# Patient Record
Sex: MALE | URBAN - METROPOLITAN AREA
[De-identification: names, ages, dates, MRNs, and addresses within clinical notes are randomized per-mention and may not be internally consistent; named-entity substitution may affect disease eponyms.]

---

## 2020-10-22 ENCOUNTER — LAB OUTSIDE AN ENCOUNTER (OUTPATIENT)
Dept: URBAN - METROPOLITAN AREA CLINIC 37 | Facility: CLINIC | Age: 50
End: 2020-10-22

## 2020-10-22 ENCOUNTER — OFFICE VISIT (OUTPATIENT)
Dept: URBAN - METROPOLITAN AREA CLINIC 37 | Facility: CLINIC | Age: 50
End: 2020-10-22

## 2020-10-22 VITALS — BODY MASS INDEX: 24.41 KG/M2 | HEIGHT: 64 IN | WEIGHT: 143 LBS

## 2020-10-22 PROBLEM — 275978004 SCREENING FOR MALIGNANT NEOPLASM OF COLON: Status: ACTIVE | Noted: 2020-02-05

## 2020-10-22 PROBLEM — 267434003: Status: ACTIVE | Noted: 2020-02-05

## 2020-10-22 RX ORDER — SODIUM SULFATE, POTASSIUM SULFATE, MAGNESIUM SULFATE 17.5; 3.13; 1.6 G/ML; G/ML; G/ML
AS DIRECTED SOLUTION, CONCENTRATE ORAL ONCE
Qty: 1 KIT | Refills: 0 | OUTPATIENT

## 2020-10-22 RX ORDER — ATORVASTATIN CALCIUM 20 MG/1
1 TABLET TABLET, FILM COATED ORAL ONCE A DAY
Qty: 30 | Status: ACTIVE | COMMUNITY

## 2020-10-22 RX ORDER — OMEGA-3/DHA/EPA/FISH OIL 60 MG-90MG
CAPSULE ORAL ONCE A DAY
Status: ACTIVE | COMMUNITY

## 2020-10-22 RX ORDER — TURMERIC 100 %
AS DIRECTED POWDER (GRAM) MISCELLANEOUS
Status: ACTIVE | COMMUNITY

## 2020-10-22 NOTE — HPI-MIGRATED HPI
Colorectal cancer screening : Patient is here for initial consultation for -> first time screening colonoscopy Patient was seen on 02/20/2020 and scheduled for colonoscopy on 03/17/2020. However, the procedure was canceled due to the pandemic. Patient is here today to have the consultation for colonoscopy again;   Colorectal cancer screening : Patients denies -> rectal bleeding, change in bowel habits, constipation, diarrhea, or abdominal pain;   Colorectal cancer screening : Current bowel movement patterns -> One soft BM daily;   Colorectal cancer screening : Family history of GI malignancy: -> Denies;     Colorectal cancer screening : Denies dysphagia or odynophagia Currently taking anticoagulants/NSAIDs: No;

## 2020-12-21 ENCOUNTER — OFFICE VISIT (OUTPATIENT)
Dept: URBAN - METROPOLITAN AREA MEDICAL CENTER 10 | Facility: MEDICAL CENTER | Age: 50
End: 2020-12-21

## 2020-12-23 ENCOUNTER — TELEPHONE ENCOUNTER (OUTPATIENT)
Dept: URBAN - METROPOLITAN AREA CLINIC 35 | Facility: CLINIC | Age: 50
End: 2020-12-23

## 2021-01-07 ENCOUNTER — DASHBOARD ENCOUNTERS (OUTPATIENT)
Age: 51
End: 2021-01-07

## 2021-01-07 PROBLEM — 68496003: Status: ACTIVE | Noted: 2021-01-07

## 2021-01-07 PROBLEM — 721704005: Status: ACTIVE | Noted: 2021-01-07

## 2021-01-11 ENCOUNTER — OFFICE VISIT (OUTPATIENT)
Dept: URBAN - METROPOLITAN AREA CLINIC 37 | Facility: CLINIC | Age: 51
End: 2021-01-11

## 2021-01-11 VITALS — HEIGHT: 63 IN | BODY MASS INDEX: 25.34 KG/M2 | WEIGHT: 143 LBS

## 2021-01-11 RX ORDER — ATORVASTATIN CALCIUM 20 MG/1
1 TABLET TABLET, FILM COATED ORAL ONCE A DAY
Qty: 30 | Status: ON HOLD | COMMUNITY

## 2021-01-11 RX ORDER — OMEGA-3/DHA/EPA/FISH OIL 60 MG-90MG
CAPSULE ORAL ONCE A DAY
Status: ON HOLD | COMMUNITY

## 2021-01-11 RX ORDER — TURMERIC 100 %
AS DIRECTED POWDER (GRAM) MISCELLANEOUS
Status: ON HOLD | COMMUNITY

## 2021-01-11 NOTE — HPI-MIGRATED HPI
Post-op OV : After EGD on -> ;   Post-op OV : After Colonoscopy on -> 12/21/2020, at which time a single polyp was detected and resected from the cecum. Histology showed it was a colonic mucosa. Non-bleeding grade II internal and external hemorrhoids were found during retroflexion. S/p internal hemorrhoids were banded x 7. Good quality bowel prep.  After the procedure patient was prescribed Colace 200 mg daily + Hydrocortisone 25 mg suppository QHS ;   Post-op OV : Patient denies -> rectal bleeding, fever, nausea & vomiting since the procedure date;   Post-op OV : Patient -> admitted that 2 days after Colonoscopy with hemorrhoid banding, he experienced difficulty in urinating but gradually resolved  Current symptoms: no longer has pain Current BM: 4 small BMs daily ;